# Patient Record
(demographics unavailable — no encounter records)

---

## 2025-01-22 NOTE — REVIEW OF SYSTEMS
[Fever] : no fever [Chills] : no chills [Feeling Tired] : not feeling tired [Chest Pain] : no chest pain [Palpitations] : no palpitations [Shortness Of Breath] : no shortness of breath [Wheezing] : no wheezing [Cough] : no cough [SOB on Exertion] : no shortness of breath during exertion [Joint Swelling] : no joint swelling [Joint Stiffness] : no joint stiffness [Limb Pain] : no limb pain [Skin Lesions] : no skin lesions [Skin Wound] : no skin wound [Itching] : no itching [de-identified] : Whellechair bound residual left sided weakness s/p CVA 2x

## 2025-01-22 NOTE — PHYSICAL EXAM
[de-identified] : residual left sided weakness s/p CVA 2x [de-identified] : Skin supple. No interdigital macerations or open lesions. Elongated mycotic toenails with subungual debris x10 [de-identified] : residual left sided weakness s/p CVA 2x. Diminished protective sensation and reduced proprioception

## 2025-01-22 NOTE — HISTORY OF PRESENT ILLNESS
[de-identified] : 62 year old male wheelchair bound presented for diabetic foot eval. PMHx of IDDM, HTN, HLD, CVA in 2016 & 2019. Elongated toenails. Otherwise denies pedal complaints

## 2025-01-22 NOTE — HISTORY OF PRESENT ILLNESS
[de-identified] : 62 year old male wheelchair bound presented for diabetic foot eval. PMHx of IDDM, HTN, HLD, CVA in 2016 & 2019. Elongated toenails. Otherwise denies pedal complaints

## 2025-01-22 NOTE — PHYSICAL EXAM
[de-identified] : residual left sided weakness s/p CVA 2x [de-identified] : Skin supple. No interdigital macerations or open lesions. Elongated mycotic toenails with subungual debris x10 [de-identified] : residual left sided weakness s/p CVA 2x. Diminished protective sensation and reduced proprioception

## 2025-01-22 NOTE — ASSESSMENT
[FreeTextEntry1] : -Patient evaluated -Aseptic debridement of mycotic dystrophic toenails x10 with subungual debris. -Educated patient on the etiology of the diabetic condition. Advised patient to monitor his blood sugar levels and stick to healthy diet. Stressed the importance of checking his feet daily for wounds and ulcers and in the case of infection present to ED promptly for management. -Patient understands and agrees -RTC in 3 month for f/u

## 2025-01-22 NOTE — REVIEW OF SYSTEMS
[Fever] : no fever [Chills] : no chills [Feeling Tired] : not feeling tired [Chest Pain] : no chest pain [Palpitations] : no palpitations [Shortness Of Breath] : no shortness of breath [Wheezing] : no wheezing [Cough] : no cough [SOB on Exertion] : no shortness of breath during exertion [Joint Swelling] : no joint swelling [Joint Stiffness] : no joint stiffness [Limb Pain] : no limb pain [Skin Lesions] : no skin lesions [Skin Wound] : no skin wound [Itching] : no itching [de-identified] : Whellechair bound residual left sided weakness s/p CVA 2x

## 2025-07-09 NOTE — PHYSICAL EXAM
[2+] : left foot dorsalis pedis 2+ [Diminished Throughout Right Foot] : diminished sensation with monofilament testing throughout right foot [Diminished Throughout Left Foot] : diminished sensation with monofilament testing throughout left foot [de-identified] : residual left sided weakness s/p CVA 2x [de-identified] : Skin supple. No interdigital macerations or open lesions. Elongated mycotic toenails with subungual debris x10 [de-identified] : residual left sided weakness s/p CVA 2x. Diminished protective sensation and reduced proprioception [de-identified] : LLE weakness 2/4 MMT LLE  [Foot Ulcer] : no foot ulcer [FreeTextEntry1] : Elongated nails x 10

## 2025-07-09 NOTE — HISTORY OF PRESENT ILLNESS
[de-identified] : 63 year old male wheelchair bound presented for diabetic foot eval. PMHx of IDDM, HTN, HLD, CVA in 2016 & 2019. Elongated toenails. Otherwise denies pedal complaints

## 2025-07-09 NOTE — REVIEW OF SYSTEMS
[Fever] : no fever [Chills] : no chills [Feeling Tired] : not feeling tired [Chest Pain] : no chest pain [Palpitations] : no palpitations [Shortness Of Breath] : no shortness of breath [Wheezing] : no wheezing [Cough] : no cough [SOB on Exertion] : no shortness of breath during exertion [Joint Swelling] : no joint swelling [Joint Stiffness] : no joint stiffness [Limb Pain] : no limb pain [Skin Lesions] : no skin lesions [Skin Wound] : no skin wound [Itching] : no itching [de-identified] : Whellechair bound residual left sided weakness s/p CVA 2x